# Patient Record
(demographics unavailable — no encounter records)

---
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
## 2024-12-02 NOTE — ASSESSMENT
[FreeTextEntry1] : Normal vital signs Follow-up left knee x-ray Mild ecchymosis right lateral rib cage with tenderness to palpation.  Lungs clear equally bilaterally.  Likely bone contusion and costochondritis Symptoms should be self resolving but if they are not she will follow-up with me for further instruction In the meantime change over to Tylenol Extra Strength 2 tabs every 8 hours as needed and use ibuprofen on a infrequent basis as opposed to 3 times daily.  Risks associated with chronic NSAID use addressed in detail.  Stop taking muscle relaxant 6 times daily.  Take only before bedtime and as needed during the day Greater than 30 minutes spent in office with patient as well as time spent reviewing hospital records
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
## 2024-12-17 NOTE — PHYSICAL EXAM
[de-identified] : Left lower extremity Hip: Normal ROM without pain on IR/ER Knee Inspection: Mild effusion Wounds: None Alignment: Valgus Palpation: tender to palpation at lateral joint line ROM active (in degrees): 0-130 with crepitus/pain through the arc of motion Ligamentous laxity: all ligaments appear stable, stable to varus stress test, stable to valgus stress test Meniscal Test: Positive McMurrays, positive Mumtaz. Muscle Test: good quad strength. [de-identified] : Outside x-rays at [facility] are reviewed-severe lateral joint space narrowing with osteophyte formation

## 2024-12-17 NOTE — PROCEDURE
[de-identified] : 12/17/24: Left knee Injection - Steroid (Methylprednisolone) The risks, benefits, and alternatives of the injection were reviewed/discussed with the patient today in office and all of their questions were answered. We discussed possible side effects and efficacy of this injection.  The patient consented to the procedure.  Prior to injection, a Time Out was conducted in accordance with [facility] policy and the site and nature of procedure verified with the patient.  Site and side were verified with the patient.  The injection site was prepped with chloroprep.  Cold spray was utilized to numb the skin. Utilizing sterile technique, 1 ml 40 mg methylprednisolone, 4 ml 1% Lidocaine, and 5 mL 0.25% Bupivicaine were injected. A sterile bandage was applied. The patient tolerated the procedure well and there were no complications.

## 2024-12-17 NOTE — DISCUSSION/SUMMARY
[de-identified] : Acute exacerbation of severe left knee osteoarthritis  Extensive discussion of the natural history of this disease was had with the patient.  We discussed the treatment options ranging from conservative therapy which includes anti-inflammatories, steroid/HA injections, physical therapy, weight loss, knee sleeves/braces, and activity modification.  We did discuss that the ultimate treatment for arthritis is a joint replacement.  However at this time we have decided to continue with conservative treatment.   A prescription for celecoxib with the appropriate warnings for cardiac, and renal side effects was given to the patient.  Patient was instructed not to use any other NSAIDs with this medication. -Patient elected for steroid injection today. Patient will follow-up as needed if the pain returns or does not improve.  The patient's current medication management of their orthopedic diagnosis was reviewed today: (1) We discussed a comprehensive treatment plan that included possible pharmaceutical management involving the use of prescription strength medications including but not limited to options such as oral Ibuprofen 400mg QID, once daily Meloxicam 15 mg, or 500-650 mg Tylenol versus over the counter oral medications and topical prescription NSAID Pennsaid vs over the counter Voltaren gel. (2) There is a moderate risk of morbidity with further treatment, especially from use of prescription strength medications and possible side effects of these medications which include upset stomach with oral medications, skin reactions to topical medications and cardiac/renal issues with long term use. (3) I recommended that the patient follow-up with their medical physician to discuss any significant specific potential issues with long term medication use such as interactions with current medications or with exacerbation of underlying medical comorbidities. (4) The benefits and risks associated with use of injectable, oral or topical, prescription and over the counter anti-inflammatory medications were discussed with the patient. The patient voiced understanding of the risks including but not limited to bleeding, stroke, kidney dysfunction, heart disease, and were referred to the black box warning label for further information.
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
## 2025-01-10 NOTE — PROCEDURE
[de-identified] : 1/10/25: Left knee Injection -Gelsyn-3 The risks, benefits, and alternatives of the injection were reviewed/discussed with the patient today in office and all of their questions were answered.  We discussed possible side effects and efficacy of this injection.  The patient consented to the procedure. Site and side were verified with the patient.  Prior to injection, a Time Out was conducted in accordance with [facility] policy and the site and nature of procedure verified with the patient. The inferolateral injection site was prepped with chloroprep.  Cold spray was utilized to numb the skin. Utilizing sterile technique, the knee was injected with hyaluronic acid. A sterile bandage was applied. The patient tolerated the procedure well and there were no complications. Lot: [id number] Exp: 3/31/2027  12/17/24: Left knee Injection - Steroid (Methylprednisolone)

## 2025-01-10 NOTE — DISCUSSION/SUMMARY
[de-identified] : Acute exacerbation of severe left knee osteoarthritis  Extensive discussion of the natural history of this disease was had with the patient.  We discussed the treatment options ranging from conservative therapy which includes anti-inflammatories, steroid/HA injections, physical therapy, weight loss, knee sleeves/braces, and activity modification.  We did discuss that the ultimate treatment for arthritis is a joint replacement.  However at this time we have decided to continue with conservative treatment.   Discontinue Celebrex is not providing relief for the knee. -Patient elected for a hyaluronic acid injection today. Patient will follow-up as needed if the pain returns or does not improve.  The patient's current medication management of their orthopedic diagnosis was reviewed today: (1) We discussed a comprehensive treatment plan that included possible pharmaceutical management involving the use of prescription strength medications including but not limited to options such as oral Ibuprofen 400mg QID, once daily Meloxicam 15 mg, or 500-650 mg Tylenol versus over the counter oral medications and topical prescription NSAID Pennsaid vs over the counter Voltaren gel. (2) There is a moderate risk of morbidity with further treatment, especially from use of prescription strength medications and possible side effects of these medications which include upset stomach with oral medications, skin reactions to topical medications and cardiac/renal issues with long term use. (3) I recommended that the patient follow-up with their medical physician to discuss any significant specific potential issues with long term medication use such as interactions with current medications or with exacerbation of underlying medical comorbidities. (4) The benefits and risks associated with use of injectable, oral or topical, prescription and over the counter anti-inflammatory medications were discussed with the patient. The patient voiced understanding of the risks including but not limited to bleeding, stroke, kidney dysfunction, heart disease, and were referred to the black box warning label for further information.

## 2025-01-10 NOTE — PHYSICAL EXAM
[de-identified] : Left lower extremity Hip: Normal ROM without pain on IR/ER Knee Inspection: Mild effusion Wounds: None Alignment: Valgus Palpation: tender to palpation at lateral joint line ROM active (in degrees): 0-130 with crepitus/pain through the arc of motion Ligamentous laxity: all ligaments appear stable, stable to varus stress test, stable to valgus stress test Meniscal Test: Positive McMurrays, positive Mumtaz. Muscle Test: good quad strength. [de-identified] : Outside x-rays at [facility] are reviewed-severe lateral joint space narrowing with osteophyte formation

## 2025-01-17 NOTE — PROCEDURE
[de-identified] : 1/17/25: Left knee Injection -Gelsyn-3 The risks, benefits, and alternatives of the injection were reviewed/discussed with the patient today in office and all of their questions were answered.  We discussed possible side effects and efficacy of this injection.  The patient consented to the procedure. Site and side were verified with the patient.  Prior to injection, a Time Out was conducted in accordance with [facility] policy and the site and nature of procedure verified with the patient. The inferolateral injection site was prepped with chloroprep.  Cold spray was utilized to numb the skin. Utilizing sterile technique, the knee was injected with hyaluronic acid. A sterile bandage was applied. The patient tolerated the procedure well and there were no complications. Lot: [id number] Exp: 3/31/2027  1/10/25: Left knee Injection -Gelsyn-3 12/17/24: Left knee Injection - Steroid (Methylprednisolone)

## 2025-01-24 NOTE — PROCEDURE
[de-identified] : 1/24/25: Left knee Injection -Gelsyn-3 The risks, benefits, and alternatives of the injection were reviewed/discussed with the patient today in office and all of their questions were answered.  We discussed possible side effects and efficacy of this injection.  The patient consented to the procedure. Site and side were verified with the patient.  Prior to injection, a Time Out was conducted in accordance with [facility] policy and the site and nature of procedure verified with the patient. The inferolateral injection site was prepped with chloroprep.  Cold spray was utilized to numb the skin. Utilizing sterile technique, the knee was injected with hyaluronic acid. A sterile bandage was applied. The patient tolerated the procedure well and there were no complications. Lot: [id number] Exp: 3/31/2027  1/10/25: Left knee Injection -Gelsyn-3 12/17/24: Left knee Injection - Steroid (Methylprednisolone)

## 2025-01-24 NOTE — PROCEDURE
[de-identified] : 1/24/25: Left knee Injection -Gelsyn-3 The risks, benefits, and alternatives of the injection were reviewed/discussed with the patient today in office and all of their questions were answered.  We discussed possible side effects and efficacy of this injection.  The patient consented to the procedure. Site and side were verified with the patient.  Prior to injection, a Time Out was conducted in accordance with [facility] policy and the site and nature of procedure verified with the patient. The inferolateral injection site was prepped with chloroprep.  Cold spray was utilized to numb the skin. Utilizing sterile technique, the knee was injected with hyaluronic acid. A sterile bandage was applied. The patient tolerated the procedure well and there were no complications. Lot: [id number] Exp: 3/31/2027  1/10/25: Left knee Injection -Gelsyn-3 12/17/24: Left knee Injection - Steroid (Methylprednisolone)

## 2025-02-13 NOTE — DISCUSSION/SUMMARY
[de-identified] : Left knee arthritis with limitations of activities of daily living and conservative treatment options failing to improve disability.  Plan: Left total Knee Arthroplasty  The patient has arthritis/ end stage joint disease of the knee supported by x-ray or MRI that demonstrated one of the following:  periarticular osteophytes; joint space narrowing; subchondral cysts; subchondral sclerosis;  bone on bone articulation; .   One or more of the following conservative treatments have been tried and failed for 3 months or more: analgesic medication; home exercises; brace; cortisone shots; anti-inflammatory medication; physical therapy; visco-supplementation therapies  We discussed the natural history of knee arthritis and the potential treatment options. The importance of diet and exercise was discussed as excess weight is a significant contributing factor. Due to the pain, failure of prior nonoperative treatment, the patient is indicated for a total knee replacement.   A risk/benefit analysis was discussed with the patient reviewing the advantages and disadvantages of surgical intervention at this time. Both the level and length of the patient's pain have made additional conservative treatment measures contraindicated. A full explanation was given of the nature and the purpose of the procedure and anesthesia, its benefits, possible alternative methods of treatment, the risks involved, the possibility of complications, the foreseeable consequences of the procedure and the possible results of the non-treatment. I reviewed the plan of care as well as a model of a total knee implant equivalent to the one that will be used for their replacement. The patient agrees with the plan of care as well as the use of implants for their total knee replacement.   The potential biologic and mechanical risks of the procedure were discussed. This discussion included but was not limited to thromboembolic disease, fracture, loss of fixation, infection, leg length discrepancy, dislocation and neurovascular injury. The patient is also understands that anesthesia and their comorbidities present additional risks. Proper expectations were addressed as this surgery may only partially or even fail to relieve their pain. The patient understands that additional surgery may be needed during the perioperative period or in the future. We discussed the durability of prosthetic knees and limitations related to wear, osteolysis and loosening. All questions were answered to the patient's satisfaction. The patient was given my packet of additional information about the procedure.  Expect 1-2 day stay in hospital as this is less than standard across the country.  Although outpatient surgery may be feasible in carefully selected heathy patients - the definition of a "healthy" patient to do this in has not been properly studied in randomized trials.  This hospital time is important to observe for urinary retention, neurologic decline, cardiopulmonary issues, and signs of blood clot which are frequent early complications of joint replacements.  This time will also be used to work with PT so they are safe to navigate without falling which could lead to fracture and more surgery.   There is no evidence for any of the following contraindications for total joint replacement surgery:  active infection; active systemic bacteremia; active skin infection or open wound at surgical site; neuropathic arthritis; severe, rapidly progressive neurologic disease; severe medical conditions that makes the risks of surgery outweigh the potential benefit.  The knee pain is affecting the ability to perform activities of daily living despite activity modifications.  The painful knee exam and the radiographic findings of cartilage loss are consistent with the knee OA as the source of the disability and pain.  I am recommending the [employer] tech Portable Connect device as it is medically necessary for the patient's surgical recovery to provide an optimal outcome.  I am also recommending an ice machine.  We did discuss that the patient's psoriatic arthritis does increase the risk of infection above the baseline 1% to approximately 2%.  She is on Tremfya which will have to be administered approximately 9 weeks prior to surgery, she will follow-up with rheumatology to discuss exact dosing.  Surgery will be scheduled at a convenient time.   DME: ROM Tech, ice machine Preop rheumatology, hematology, medical clearance.  Postop DVT ppx: Per Caprini Score Abx ppx: Ancef extended Duricef secondary to psoriatic arthritis Dressing: Negative pressure dressing Polyethylene: PS/CPS
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
## 2025-02-13 NOTE — PROCEDURE
[de-identified] : 1/10/25: Left knee Injection -Gelsyn-3 12/17/24: Left knee Injection - Steroid (Methylprednisolone)

## 2025-02-13 NOTE — PHYSICAL EXAM
[de-identified] : Left lower extremity Hip: Normal ROM without pain on IR/ER Knee Inspection: Mild effusion Wounds: None Alignment: Valgus Palpation: tender to palpation at lateral joint line ROM active (in degrees): 0-120 with crepitus/pain through the arc of motion Ligamentous laxity: all ligaments appear stable, stable to varus stress test, stable to valgus stress test Meniscal Test: Positive McMurrays, positive Mumtaz. Muscle Test: good quad strength. [de-identified] : 2/13/25: Left knee xrays, standing AP/Lateral and Merchant films, and 45 degree PA standing view are reviewed and demonstrate diffuse tricompartmental degenerative arthritis, lateral joint space narrowing, marginal osteophytes, bone on bone with sclerosis, patellofemoral joint space narrowing  Outside x-rays at [facility] are reviewed-severe lateral joint space narrowing with osteophyte formation

## 2025-03-18 NOTE — ASSESSMENT
[FreeTextEntry1] :  Specilaists Involved: -Uro/GYN: Dr. David -Cardio: Dr. Vasquez ([facility]) -Rheum: Dr. Peraza -Pain Mgt: Dr. Irineo Garcia -Heme: Dr. Matilde Mclean ([phone]) -GI: Dr. Fernandes ([phone]  -F/u labs drawn in office today -Further recs pending lab results -Continue f/u w/ Heme (Thrombocytosis, Iron Def Anemia) -Continue f/u w/ Pain Mgt (Chronic Pain) -Continue f/u w/ Rheum (Psoriatic Arthritis) -Continue f/u w/ Cardio (HLD) -Continue f/u w/ GI (Anemia & H/o GIB) -RTO 6mo for routine f/u & labs or sooner if needed.

## 2025-03-18 NOTE — HISTORY OF PRESENT ILLNESS
[FreeTextEntry1] : annual well visit [de-identified] : -PMH: HTN, HLD, Hypothyroidism, GERD, Osteopenia w/ Inc FRAX, Psoriatic Arthritis, Sciatica, Insomnia, H/o GIB (6/2020) 2/2 Chronic NSAID use, Cervical Disc Dz, Chronic Pain, Anxiety, Depression, -SH: . 4 children. Former smoker (Quit 1998). Daily EtOH use.  ROCIO is a 81 year F whom is here today for annual well check   Specilaists Involved: -Uro/GYN: Dr. David -Cardio: Dr. Vasquez ([facility]) -Rheum: Dr. Peraza -Pain Mgt: Dr. Irineo Garcia -Heme: Dr. Matilde Mclean ([phone]) -GI: Dr. Fernandes ([phone]  -Vaccines: Needs Prevnar 20 -DEXA: 10/2020? -Mammogram: 1/2024 -Colonoscopy: 1/2022. +Lymphocytic Colitis -FH of Colon, breast or ovarian CA: Breast CA  -HTN: Remains on Amlodipine 10mg HS. -HLD: Remains on Repatha 2x/mo. Follows w/ Cardio.   -Hypothyroidism: Remains on Levothyroxine 75mcg QD EXCEPT Sundays. No reported changes.  -Anxiety, Depression & Chronic Pain: Remains on Cymbalta 60mg QD. Continues to Utilize Medical Marijuana.  -Cervical Disc Dz: (1/2022) S/p Cervical Fusion. -Psoriatic Arthritis: on Tremfya. Follows w/ Rheum. -Osteopenia w/ Increased FRAX Score. Last DEXA 10/2020. Previously on Fosamax. Remains on Vit-D sup. Follows w/ Rheum  -GERD, h/o GIB, h/o small bowel vascular angiectasias: Remains on Omeprazole 40mg QD. Follows w/ GI. -Iron Def Anemia:  Following w/ Heme & GI. Managed on PRN Iron infusion per Heme -Thrombocytosis & h/o Episodic leukocytosis: remains on ASA 81mg QD. Following w/ Heme.

## 2025-03-18 NOTE — HEALTH RISK ASSESSMENT
[Very Good] : ~his/her~  mood as very good [2 - 3 times a week (3 pts)] : 2 - 3  times a week (3 points) [1 or 2 (0 pts)] : 1 or 2 (0 points) [Never (0 pts)] : Never (0 points) [No] : In the past 12 months have you used drugs other than those required for medical reasons? No [No falls in past year] : Patient reported no falls in the past year [Other reason not done] : Other reason not done [I have developed a follow-up plan documented below in the note.] : I have developed a follow-up plan documented below in the note. [Patient reported mammogram was normal] : Patient reported mammogram was normal [Patient reported bone density results were abnormal] : Patient reported bone density results were abnormal [Patient reported colonoscopy was abnormal] : Patient reported colonoscopy was abnormal [HIV test declined] : HIV test declined [Hepatitis C test declined] : Hepatitis C test declined [None] : None [With Family] : lives with family [] :  [# Of Children ___] : has [unfilled] children [Fully functional (bathing, dressing, toileting, transferring, walking, feeding)] : Fully functional (bathing, dressing, toileting, transferring, walking, feeding) [Fully functional (using the telephone, shopping, preparing meals, housekeeping, doing laundry, using] : Fully functional and needs no help or supervision to perform IADLs (using the telephone, shopping, preparing meals, housekeeping, doing laundry, using transportation, managing medications and managing finances) [Reviewed no changes] : Reviewed, no changes [Audit-CScore] : 3 [[facility]] : 10 [de-identified] : being managed on Cymbalta. See HPI & Plan [Yes] : Reviewed medication list for presence of high-risk medications. [Benzodiazepines] : benzodiazepines [Opioids] : opioids [Muscle Relaxants] : muscle relaxants [Never] : Never [Change in mental status noted] : No change in mental status noted [Reports changes in hearing] : Reports no changes in hearing [Reports changes in vision] : Reports no changes in vision [MammogramDate] : 01/24 [MammogramComments] : per patient. [BoneDensityDate] : 10/20 [ColonoscopyDate] : 01/22 [AdvancecareDate] : 03/25

## 2025-04-09 NOTE — HISTORY OF PRESENT ILLNESS
[FreeTextEntry1] : anxiety, sleep difficulties, positional vertigo [de-identified] : -PMH: HTN, HLD, Hypothyroidism, GERD, Osteopenia w/ Inc FRAX, Psoriatic Arthritis, Sciatica, Insomnia, H/o GIB (6/2020) 2/2 Chronic NSAID use, Cervical Disc Dz, Chronic Pain, Anxiety, Depression, -SH: . 4 children. Former smoker (Quit 1998). Daily EtOH use.  ROCIO is a 81 year F whom is here today for increased anxiety, sleep difficulties, positional vertigo Patient states that everything going on with her  she has been having increased anxiety and difficulty sleeping as a result. Patient also reports that about 1 week ago she had recurrence of her known positional vertigo and has been following with ENT with improving symptoms.  Denies any falls as a result of vertigo.  Denies any headache, hearing or vision changes, slurred speech, motor weakness.  -HTN: Remains on Amlodipine 10mg HS. -HLD: Remains on Repatha 2x/mo. Follows w/ Cardio.   -Hypothyroidism: Remains on Levothyroxine 75mcg QD EXCEPT Sundays. No reported changes.  -Anxiety, Depression & Chronic Pain: Remains on Cymbalta 60mg QD. Continues to Utilize Medical Marijuana.  -Cervical Disc Dz: (1/2022) S/p Cervical Fusion. -Psoriatic Arthritis: on Tremfya. Follows w/ Rheum. -Osteopenia w/ Increased FRAX Score. Last DEXA 10/2020. Previously on Fosamax. Remains on Vit-D sup. Follows w/ Rheum  -GERD, h/o GIB, h/o small bowel vascular angiectasias: Remains on Omeprazole 40mg QD. Follows w/ GI. -Iron Def Anemia:  Following w/ Heme & GI. Managed on PRN Iron infusion per Heme -Thrombocytosis & h/o Episodic leukocytosis: remains on ASA 81mg QD. Following w/ Heme.

## 2025-04-09 NOTE — PHYSICAL EXAM
[Normal] : no carotid or abdominal bruits heard, no varicosities, pedal pulses are present, no peripheral edema, no extremity clubbing or cyanosis and no palpable aorta [No Focal Deficits] : no focal deficits [de-identified] : Positive [place]-Hallpike maneuver

## 2025-04-09 NOTE — ASSESSMENT
[FreeTextEntry1] : Specilaists Involved: -Uro/GYN: Dr. David -Cardio: Dr. Vasquez ([facility]) -Rheum: Dr. Peraza -Pain Mgt: Dr. Irineo Garcia -Heme: Dr. Matilde Mclean ([phone]) -GI: Dr. Fernandes ([phone]  Normal vital signs Physical exam pertinent for positive [place]-Hallpike maneuver Fall precautions addressed in detail Vestibular rehab therapy evaluation and treatment ordered As needed meclizine As needed Xanax for anxiety/anxiety related sleep difficulties.  I advised patient that meclizine and Xanax should not be taken at the same time as they can have additive effects of increased risk for fall/confusion and sedation New or worsening symptoms despite the above patient to return to office for further evaluation or to go to the nearest ER  -Continue f/u w/ Heme (Thrombocytosis, Iron Def Anemia) -Continue f/u w/ Pain Mgt (Chronic Pain) -Continue f/u w/ Rheum (Psoriatic Arthritis) -Continue f/u w/ Cardio (HLD) -Continue f/u w/ GI (Anemia & H/o GIB) -RTO 6mo for routine f/u & labs or sooner if needed.

## 2025-05-15 NOTE — ASSESSMENT
[FreeTextEntry1] : Specilaists Involved: -Uro/GYN: Dr. David -Cardio: Dr. Vasquez ([facility]) -Rheum: Dr. Peraza -Pain Mgt: Dr. Irineo Garcia -Heme: Dr. Matilde Mclean ([phone]) -GI: Dr. Fernandes ([phone]  Normal vital signs  -Continue f/u w/ Heme (Thrombocytosis, Iron Def Anemia) -Continue f/u w/ Pain Mgt (Chronic Pain) -Continue f/u w/ Rheum (Psoriatic Arthritis) -Continue f/u w/ Cardio (HLD) -Continue f/u w/ GI (Anemia & H/o GIB) -RTO 6mo for routine f/u & labs or sooner if needed.

## 2025-05-15 NOTE — HISTORY OF PRESENT ILLNESS
[FreeTextEntry1] :  anxiety, sleep difficulties [de-identified] : -PMH: HTN, HLD, Hypothyroidism, GERD, Osteopenia w/ Inc FRAX, Psoriatic Arthritis, Sciatica, Insomnia, H/o GIB (6/2020) 2/2 Chronic NSAID use, Cervical Disc Dz, Chronic Pain, Anxiety, Depression, -SH: . 4 children. Former smoker (Quit 1998). Daily EtOH use.  ROCIO is a 81 year F whom is here today for increased anxiety, sleep difficulties states xanax HS has been helping w/ sleep related anxiety  -HTN: Remains on Amlodipine 10mg HS. -HLD: Remains on Repatha 2x/mo. Follows w/ Cardio.   -Hypothyroidism: Remains on Levothyroxine 75mcg QD EXCEPT Sundays. No reported changes.  -Anxiety, Depression & Chronic Pain: Remains on Cymbalta 60mg QD. Continues to Utilize Medical Marijuana.  -Cervical Disc Dz: (1/2022) S/p Cervical Fusion. -Psoriatic Arthritis: on Tremfya. Follows w/ Rheum. -Osteopenia w/ Increased FRAX Score. Last DEXA 10/2020. Previously on Fosamax. Remains on Vit-D sup. Follows w/ Rheum  -GERD, h/o GIB, h/o small bowel vascular angiectasias: Remains on Omeprazole 40mg QD. Follows w/ GI. -Iron Def Anemia:  Following w/ Heme & GI. Managed on PRN Iron infusion per Heme -Thrombocytosis & h/o Episodic leukocytosis: remains on ASA 81mg QD. Following w/ Heme.

## 2025-05-15 NOTE — PHYSICAL EXAM
[Normal] : no carotid or abdominal bruits heard, no varicosities, pedal pulses are present, no peripheral edema, no extremity clubbing or cyanosis and no palpable aorta [No Focal Deficits] : no focal deficits [de-identified] : Positive [place]-Hallpike maneuver

## 2025-06-26 NOTE — PROCEDURE
[de-identified] : 6/24/25: Left knee Injection - Steroid (Methylprednisolone) The risks, benefits, and alternatives of the injection were reviewed/discussed with the patient today in office and all of their questions were answered. We discussed possible side effects and efficacy of this injection.  The patient consented to the procedure.  Prior to injection, a Time Out was conducted in accordance with [facility] policy and the site and nature of procedure verified with the patient.  Site and side were verified with the patient.  The injection site was prepped with chloroprep.  Cold spray was utilized to numb the skin. Utilizing sterile technique, 1 ml 40 mg methylprednisolone, 4 ml 1% Lidocaine, and 5 mL 0.25% Bupivicaine were injected. A sterile bandage was applied. The patient tolerated the procedure well and there were no complications.  1/10/25: Left knee Injection -Gelsyn-3 12/17/24: Left knee Injection - Steroid (Methylprednisolone)

## 2025-06-26 NOTE — DISCUSSION/SUMMARY
[de-identified] : Severe left knee osteoarthritis  Extensive discussion of the natural history of this disease was had with the patient.  We discussed the treatment options ranging from conservative therapy which includes anti-inflammatories, steroid/HA injections, physical therapy, weight loss, knee sleeves/braces, and activity modification.  We did discuss that the ultimate treatment for arthritis is a joint replacement.  However at this time we have decided to continue with conservative treatment.   A prescription for celecoxib with the appropriate warnings for cardiac, and renal side effects was given to the patient.  Patient was instructed not to use any other NSAIDs with this medication. -Patient elected for steroid injection today. Patient will follow-up in 2 weeks for reevaluation.  Discussed we could consider Euflexxa if she is continue to have pain.  The patient's current medication management of their orthopedic diagnosis was reviewed today: (1) We discussed a comprehensive treatment plan that included possible pharmaceutical management involving the use of prescription strength medications including but not limited to options such as oral Ibuprofen 400mg QID, once daily Meloxicam 15 mg, or 500-650 mg Tylenol versus over the counter oral medications and topical prescription NSAID Pennsaid vs over the counter Voltaren gel. (2) There is a moderate risk of morbidity with further treatment, especially from use of prescription strength medications and possible side effects of these medications which include upset stomach with oral medications, skin reactions to topical medications and cardiac/renal issues with long term use. (3) I recommended that the patient follow-up with their medical physician to discuss any significant specific potential issues with long term medication use such as interactions with current medications or with exacerbation of underlying medical comorbidities. (4) The benefits and risks associated with use of injectable, oral or topical, prescription and over the counter anti-inflammatory medications were discussed with the patient. The patient voiced understanding of the risks including but not limited to bleeding, stroke, kidney dysfunction, heart disease, and were referred to the black box warning label for further information.

## 2025-06-26 NOTE — PHYSICAL EXAM
[de-identified] : Left lower extremity Hip: Normal ROM without pain on IR/ER Knee Inspection: Mild effusion Wounds: None Alignment: Valgus Palpation: tender to palpation at lateral joint line ROM active (in degrees): 0-120 with crepitus/pain through the arc of motion Ligamentous laxity: all ligaments appear stable, stable to varus stress test, stable to valgus stress test Meniscal Test: Positive McMurrays, positive Mumtaz. Muscle Test: good quad strength. [de-identified] : 2/13/25: Left knee xrays, standing AP/Lateral and Merchant films, and 45 degree PA standing view are reviewed and demonstrate diffuse tricompartmental degenerative arthritis, lateral joint space narrowing, marginal osteophytes, bone on bone with sclerosis, patellofemoral joint space narrowing  Outside x-rays at [facility] are reviewed-severe lateral joint space narrowing with osteophyte formation
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
## 2025-07-17 NOTE — PHYSICAL EXAM
[de-identified] : Left lower extremity Hip: Normal ROM without pain on IR/ER Knee Inspection: Mild effusion Wounds: None Alignment: Valgus Palpation: tender to palpation at lateral joint line ROM active (in degrees): 0-120 with crepitus/pain through the arc of motion Ligamentous laxity: all ligaments appear stable, stable to varus stress test, stable to valgus stress test Meniscal Test: Positive McMurrays, positive Mumtaz. Muscle Test: good quad strength. [de-identified] : 2/13/25: Left knee xrays, standing AP/Lateral and Merchant films, and 45 degree PA standing view are reviewed and demonstrate diffuse tricompartmental degenerative arthritis, lateral joint space narrowing, marginal osteophytes, bone on bone with sclerosis, patellofemoral joint space narrowing  Outside [facility]-rays at [facility] are reviewed-severe lateral joint space narrowing with osteophyte formation

## 2025-07-17 NOTE — PROCEDURE
[de-identified] : 7/17/25: Left knee Injection -Gelsyn-3 The risks, benefits, and alternatives of the injection were reviewed/discussed with the patient today in office and all of their questions were answered.  We discussed possible side effects and efficacy of this injection.  The patient consented to the procedure. Site and side were verified with the patient.  Prior to injection, a Time Out was conducted in accordance with [facility] policy and the site and nature of procedure verified with the patient. The inferolateral injection site was prepped with chloroprep.  Cold spray was utilized to numb the skin. Utilizing sterile technique, the knee was injected with hyaluronic acid. A sterile bandage was applied. The patient tolerated the procedure well and there were no complications. Lot: [id number] Exp: 6/30/2020 6/24/25: Left knee Injection - Steroid (Methylprednisolone) 1/10/25: Left knee Injection -Gelsyn-3 12/17/24: Left knee Injection - Steroid (Methylprednisolone)

## 2025-07-17 NOTE — DISCUSSION/SUMMARY
[de-identified] : Severe left knee osteoarthritis  Extensive discussion of the natural history of this disease was had with the patient.  We discussed the treatment options ranging from conservative therapy which includes anti-inflammatories, steroid/HA injections, physical therapy, weight loss, knee sleeves/braces, and activity modification.  We did discuss that the ultimate treatment for arthritis is a joint replacement.  However at this time we have decided to continue with conservative treatment.   -Patient elected for hyaluronic acid injection today. Celebrex as needed. Patient will return for series.
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
## 2025-07-29 NOTE — PROCEDURE
[de-identified] : 7/31/25: Left knee Injection -Gelsyn-3 The risks, benefits, and alternatives of the injection were reviewed/discussed with the patient today in office and all of their questions were answered. We discussed possible side effects and efficacy of this injection. The patient consented to the procedure. Site and side were verified with the patient. Prior to injection, a Time Out was conducted in accordance with [facility] policy and the site and nature of procedure verified with the patient. The inferolateral injection site was prepped with chloroprep. Cold spray was utilized to numb the skin. Utilizing sterile technique, the knee was injected with hyaluronic acid. A sterile bandage was applied. The patient tolerated the procedure well and there were no complications. Lot: [id number] Exp: 6/30/2020 7/17/25: Left knee Injection -Gelsyn-3 6/24/25: Left knee Injection - Steroid (Methylprednisolone) 1/10/25: Left knee Injection -Gelsyn-3 12/17/24: Left knee Injection - Steroid (Methylprednisolone).

## 2025-07-29 NOTE — PROCEDURE
[de-identified] : 7/31/25: Left knee Injection -Gelsyn-3 The risks, benefits, and alternatives of the injection were reviewed/discussed with the patient today in office and all of their questions were answered. We discussed possible side effects and efficacy of this injection. The patient consented to the procedure. Site and side were verified with the patient. Prior to injection, a Time Out was conducted in accordance with [facility] policy and the site and nature of procedure verified with the patient. The inferolateral injection site was prepped with chloroprep. Cold spray was utilized to numb the skin. Utilizing sterile technique, the knee was injected with hyaluronic acid. A sterile bandage was applied. The patient tolerated the procedure well and there were no complications. Lot: [id number] Exp: 6/30/2020 7/17/25: Left knee Injection -Gelsyn-3 6/24/25: Left knee Injection - Steroid (Methylprednisolone) 1/10/25: Left knee Injection -Gelsyn-3 12/17/24: Left knee Injection - Steroid (Methylprednisolone).